# Patient Record
(demographics unavailable — no encounter records)

---

## 2024-12-11 NOTE — HISTORY OF PRESENT ILLNESS
[Currently Active] : currently active [Mammogramdate] : 8/24 [PapSmeardate] : 1 YR AGO [BoneDensityDate] : 2023 [ColonoscopyDate] : <5YRS AGO [PGHxTotal] : 3 [[facility]Term] : 3 [FreeTextEntry1] : AGE 55